# Patient Record
Sex: MALE | Race: BLACK OR AFRICAN AMERICAN | NOT HISPANIC OR LATINO
[De-identification: names, ages, dates, MRNs, and addresses within clinical notes are randomized per-mention and may not be internally consistent; named-entity substitution may affect disease eponyms.]

---

## 2021-09-22 PROBLEM — Z00.00 ENCOUNTER FOR PREVENTIVE HEALTH EXAMINATION: Status: ACTIVE | Noted: 2021-09-22

## 2021-09-30 ENCOUNTER — APPOINTMENT (OUTPATIENT)
Dept: NEPHROLOGY | Facility: CLINIC | Age: 71
End: 2021-09-30
Payer: MEDICARE

## 2021-09-30 VITALS
DIASTOLIC BLOOD PRESSURE: 75 MMHG | SYSTOLIC BLOOD PRESSURE: 152 MMHG | BODY MASS INDEX: 22.53 KG/M2 | WEIGHT: 170 LBS | HEIGHT: 73 IN | HEART RATE: 72 BPM

## 2021-09-30 PROCEDURE — 99204 OFFICE O/P NEW MOD 45 MIN: CPT

## 2021-09-30 RX ORDER — AMLODIPINE BESYLATE 10 MG/1
10 TABLET ORAL
Refills: 0 | Status: ACTIVE | COMMUNITY

## 2021-09-30 NOTE — HISTORY OF PRESENT ILLNESS
[FreeTextEntry1] : 71-year-old man who looks much younger than his stated age, is referred here by Dr. Luis Eid with hypertension and hyper kalemia by history.  I have no progress notes or labs from there -the patient was told his potassium was high but no numbers are available.  I have called the office but they are on lunch break for 90 minutes.  He has been hypertensive for several years, and is currently on amlodipine 10 mg daily, metoprolol 100 mg daily, and telmisartan 80 mg daily.  He only began telmisartan recently, after another pill was stopped, which we think was losartan HCT -but not yet corroborated.  He has no history of renal disease or cardiac disease.  He does not check BP at home, but his BP was generally 1 30-1 35 systolic on his old regimen, and is closer to 140 recently on the new regimen.  He exercises regularly and actually does sprint triathlons.  He does not add salt to his food but does eat out in restaurants regularly.  He is a non-smoker and there is no family history of hypertension or renal disease.

## 2021-09-30 NOTE — PHYSICAL EXAM
[General Appearance - Alert] : alert [General Appearance - In No Acute Distress] : in no acute distress [Sclera] : the sclera and conjunctiva were normal [PERRL With Normal Accommodation] : pupils were equal in size, round, and reactive to light [Outer Ear] : the ears and nose were normal in appearance [Neck Appearance] : the appearance of the neck was normal [Neck Cervical Mass (___cm)] : no neck mass was observed [Jugular Venous Distention Increased] : there was no jugular-venous distention [Auscultation Breath Sounds / Voice Sounds] : lungs were clear to auscultation bilaterally [Heart Rate And Rhythm] : heart rate was normal and rhythm regular [Heart Sounds] : normal S1 and S2 [Heart Sounds Gallop] : no gallops [Murmurs] : no murmurs [Heart Sounds Pericardial Friction Rub] : no pericardial rub [Abnormal Walk] : normal gait [Nail Clubbing] : no clubbing  or cyanosis of the fingernails [Musculoskeletal - Swelling] : no joint swelling seen [Motor Tone] : muscle strength and tone were normal [Skin Color & Pigmentation] : normal skin color and pigmentation [Skin Turgor] : normal skin turgor [] : no rash [Deep Tendon Reflexes (DTR)] : deep tendon reflexes were 2+ and symmetric [Sensation] : the sensory exam was normal to light touch and pinprick [No Focal Deficits] : no focal deficits [Oriented To Time, Place, And Person] : oriented to person, place, and time [Impaired Insight] : insight and judgment were intact [Affect] : the affect was normal

## 2021-09-30 NOTE — CONSULT LETTER
[Dear  ___] : Dear  [unfilled], [Consult Letter:] : I had the pleasure of evaluating your patient, [unfilled]. [Please see my note below.] : Please see my note below. [Consult Closing:] : Thank you very much for allowing me to participate in the care of this patient.  If you have any questions, please do not hesitate to contact me. [Sincerely,] : Sincerely, [FreeTextEntry2] : Dr cat Eid [FreeTextEntry3] : Sincerely, \par \par Main Mccartney MD, FACP

## 2021-09-30 NOTE — ASSESSMENT
[FreeTextEntry1] : Very youthful 71-year-old man with hypertension and a history of "hyperkalemia" -but I do not know any of his labs.  In terms of his regimen, I have asked him to stay on amlodipine 10 mg daily and telmisartan 80 mg daily -both are excellent choices.  I am not is fond of metoprolol 100 mg daily because beta-blockers are notoriously ineffective in lowering BP in -Americans.  I have asked him to taper to 50 mg daily and I am adding chlorthalidone 25 mg -one half tab Monday Wednesday Friday, which should augment the effect of the ARB and bring his BP down.  I have ordered labs to be done today including BMP, plasma potassium, uric acid, plasma renin activity, serum aldosterone, urinalysis, and urine microalbumin.  He will return in 3 weeks.  I have also asked him to buy an [employer] home BP cuff and bring it with him to the next visit.  We also discussed 24-hour ambulatory BP monitoring which I suspect we will do in the near future -that will give us a much better idea of real world blood pressures, plus establish whether he is a dipper or not.  -Americans experience a smaller dip in BP with sleep then whites, which we believe leads to a higher risk of cardiovascular, CNS, and renal damage.

## 2021-10-21 ENCOUNTER — APPOINTMENT (OUTPATIENT)
Dept: NEPHROLOGY | Facility: CLINIC | Age: 71
End: 2021-10-21
Payer: MEDICARE

## 2021-10-21 VITALS
SYSTOLIC BLOOD PRESSURE: 146 MMHG | DIASTOLIC BLOOD PRESSURE: 74 MMHG | HEART RATE: 72 BPM | WEIGHT: 170 LBS | BODY MASS INDEX: 23.03 KG/M2 | HEIGHT: 72 IN

## 2021-10-21 VITALS — SYSTOLIC BLOOD PRESSURE: 144 MMHG | DIASTOLIC BLOOD PRESSURE: 73 MMHG

## 2021-10-21 LAB
APPEARANCE: CLEAR
BACTERIA: NEGATIVE
BILIRUBIN URINE: NEGATIVE
BLOOD URINE: NORMAL
COLOR: YELLOW
GLUCOSE QUALITATIVE U: NEGATIVE
HYALINE CASTS: 1 /LPF
KETONES URINE: NEGATIVE
LEUKOCYTE ESTERASE URINE: NEGATIVE
MICROSCOPIC-UA: NORMAL
NITRITE URINE: NEGATIVE
PH URINE: 6
PROTEIN URINE: NORMAL
RED BLOOD CELLS URINE: 0 /HPF
SPECIFIC GRAVITY URINE: 1.02
SQUAMOUS EPITHELIAL CELLS: 1 /HPF
UROBILINOGEN URINE: NORMAL
WHITE BLOOD CELLS URINE: 1 /HPF

## 2021-10-21 PROCEDURE — 99214 OFFICE O/P EST MOD 30 MIN: CPT

## 2021-10-21 NOTE — ASSESSMENT
[FreeTextEntry1] : 71-year-old man with resistant hypertension, which has thus far not improved with the addition of chlorthalidone 12.5 mg Monday Wednesday Friday.  I have asked him to replace telmisartan with Edarbi 40 mg daily for the next 2 weeks.  It may be necessary to increase his chlorthalidone to daily but I will wait to see his response first.  I have ordered labs to include BMP, plasma potassium, urinalysis, uric acid.  If his BP is still not at target, I may consider replacing metoprolol with Bystolic, a beta-blocker with a very good track record in general and specifically in -Americans.  He will return in 12 to 14 days to recheck.

## 2021-10-21 NOTE — CONSULT LETTER
[Dear  ___] : Dear  [unfilled], [Consult Letter:] : I had the pleasure of evaluating your patient, [unfilled]. [Please see my note below.] : Please see my note below. [Consult Closing:] : Thank you very much for allowing me to participate in the care of this patient.  If you have any questions, please do not hesitate to contact me. [Sincerely,] : Sincerely, [FreeTextEntry2] : Dr Destiny Kent [FreeTextEntry3] : Sincerely, \par \par Main Mccartney MD, FACP\par

## 2021-10-21 NOTE — HISTORY OF PRESENT ILLNESS
[FreeTextEntry1] : 71-year-old man looking much younger than his stated age, referred by Dr. Destiny Kent because of resistant hypertension.  His BP was in the 148-152/74-76 range 3 weeks ago on a regimen of telmisartan 80 mg daily, amlodipine 10 mg daily, metoprolol 100 mg daily.  I asked him to reduce metoprolol to 50, because beta-blockers are relatively ineffective in -Americans.  I added chlorthalidone 12.5 mg Monday Wednesday Friday to further lower blood pressure, synergize with the ARB, and lower potassium.  He feels better on this regimen in a nonspecific fashion.  He does note mild constipation, which could be diuretic related.  However his BP has not yet improved.

## 2021-10-27 LAB
ANION GAP SERPL CALC-SCNC: 15 MMOL/L
BUN SERPL-MCNC: 29 MG/DL
CALCIUM SERPL-MCNC: 9.5 MG/DL
CHLORIDE SERPL-SCNC: 106 MMOL/L
CO2 SERPL-SCNC: 17 MMOL/L
CREAT SERPL-MCNC: 1.64 MG/DL
GLUCOSE SERPL-MCNC: 88 MG/DL
POTASSIUM SERPL-SCNC: 4.8 MMOL/L
POTASSIUM SERPL-SCNC: 4.9 MMOL/L
SODIUM SERPL-SCNC: 139 MMOL/L
URATE SERPL-MCNC: 9.6 MG/DL

## 2021-11-15 ENCOUNTER — APPOINTMENT (OUTPATIENT)
Dept: NEPHROLOGY | Facility: CLINIC | Age: 71
End: 2021-11-15
Payer: MEDICARE

## 2021-11-15 VITALS
WEIGHT: 170 LBS | HEIGHT: 72 IN | BODY MASS INDEX: 23.03 KG/M2 | HEART RATE: 70 BPM | DIASTOLIC BLOOD PRESSURE: 72 MMHG | SYSTOLIC BLOOD PRESSURE: 138 MMHG

## 2021-11-15 PROCEDURE — 99214 OFFICE O/P EST MOD 30 MIN: CPT

## 2021-11-15 NOTE — PHYSICAL EXAM
[General Appearance - Alert] : alert [General Appearance - In No Acute Distress] : in no acute distress [Sclera] : the sclera and conjunctiva were normal [PERRL With Normal Accommodation] : pupils were equal in size, round, and reactive to light [Outer Ear] : the ears and nose were normal in appearance [Neck Appearance] : the appearance of the neck was normal [Neck Cervical Mass (___cm)] : no neck mass was observed [Jugular Venous Distention Increased] : there was no jugular-venous distention [Auscultation Breath Sounds / Voice Sounds] : lungs were clear to auscultation bilaterally [Heart Rate And Rhythm] : heart rate was normal and rhythm regular [Heart Sounds] : normal S1 and S2 [Heart Sounds Gallop] : no gallops [Murmurs] : no murmurs [Heart Sounds Pericardial Friction Rub] : no pericardial rub [Abnormal Walk] : normal gait [Nail Clubbing] : no clubbing  or cyanosis of the fingernails [Motor Tone] : muscle strength and tone were normal [Musculoskeletal - Swelling] : no joint swelling seen [Skin Color & Pigmentation] : normal skin color and pigmentation [Skin Turgor] : normal skin turgor [] : no rash [Deep Tendon Reflexes (DTR)] : deep tendon reflexes were 2+ and symmetric [Sensation] : the sensory exam was normal to light touch and pinprick [No Focal Deficits] : no focal deficits [Oriented To Time, Place, And Person] : oriented to person, place, and time [Impaired Insight] : insight and judgment were intact [Affect] : the affect was normal

## 2021-11-15 NOTE — ASSESSMENT
[FreeTextEntry1] : 71-year-old man with resistant hypertension, but slight worsening of renal function.  I have asked him to schedule a renal ultrasound to assess kidney size, echogenicity, and rule out obstructive uropathy.  I have also ordered a BMP and uric acid to be done in 2 months, followed by a repeat visit.

## 2021-11-15 NOTE — CONSULT LETTER
[Dear  ___] : Dear  [unfilled], [Consult Letter:] : I had the pleasure of evaluating your patient, [unfilled]. [Please see my note below.] : Please see my note below. [Consult Closing:] : Thank you very much for allowing me to participate in the care of this patient.  If you have any questions, please do not hesitate to contact me. [Sincerely,] : Sincerely, [FreeTextEntry2] : Destiny Kent [FreeTextEntry3] : Sincerely, \par \par Main Mccartney MD, FACP

## 2021-11-15 NOTE — HISTORY OF PRESENT ILLNESS
[FreeTextEntry1] : 71-year-old man referred by Dr. Destiny Kent with resistant hypertension. -BP control is improving but probably still slightly suboptimal.  He is now on amlodipine 10 mg daily, Edarbi 40 mg daily, and metoprolol 50 mg daily.  He never actually added the chlorthalidone that I intended at 12.5 mg 3 times a week.  But his uric acid was also 9.6, so I will hold off on that for now.  He feels well.  His creatinine has increased from about 1.4 up to 1.64.  He has trace proteinuria.  His K is 4.8.  His CO2 was 17, so I started him on sodium bicarbonate 650 mg twice daily.

## 2022-01-11 ENCOUNTER — APPOINTMENT (OUTPATIENT)
Dept: NEPHROLOGY | Facility: CLINIC | Age: 72
End: 2022-01-11
Payer: MEDICARE

## 2022-01-11 VITALS
HEART RATE: 68 BPM | HEIGHT: 72 IN | WEIGHT: 170 LBS | BODY MASS INDEX: 23.03 KG/M2 | DIASTOLIC BLOOD PRESSURE: 71 MMHG | SYSTOLIC BLOOD PRESSURE: 142 MMHG

## 2022-01-11 PROCEDURE — 99214 OFFICE O/P EST MOD 30 MIN: CPT

## 2022-01-11 NOTE — HISTORY OF PRESENT ILLNESS
[FreeTextEntry1] : 71-year-old man referred by Dr. Kent with resistant hypertension.  He feels considerably better since we changed his regimen -he is now on amlodipine 10 mg daily, Edarbi 80 mg daily, metoprolol 50 mg daily, but he never took the chlorthalidone that I ordered.  His creatinine in October was up to 1.64 with a GFR of 42-48 and a K of 4.9, with a CO2 of only 17.  I ordered sodium bicarbonate 650 mg twice daily, but he has not been taking it.  I explained to him in detail 3 major reasons why it is of benefit.  He loves pickles and eats them regularly.

## 2022-01-11 NOTE — ASSESSMENT
[FreeTextEntry1] : 71-year-old man with resistant hypertension and CKD 3B, who is BP has improved but is still suboptimal.  I have asked him to start chlorthalidone 12.5 mg Monday Wednesday Friday and return in 6 weeks to reassess.  I also explained why metabolic acidosis is so important to treat : 1. it accelerates deterioration of renal function ,  2.  It leads to demineralization of bone,   3.  It worsens muscle function.  He agrees to take sodium bicarbonate 650 mg twice daily.  I have ordered labs to be done including H&H, BMP, PTH, urine microalbumin.

## 2022-01-11 NOTE — CONSULT LETTER
[Dear  ___] : Dear  [unfilled], [Consult Letter:] : I had the pleasure of evaluating your patient, [unfilled]. [Please see my note below.] : Please see my note below. [Consult Closing:] : Thank you very much for allowing me to participate in the care of this patient.  If you have any questions, please do not hesitate to contact me. [Sincerely,] : Sincerely, [FreeTextEntry2] : Dr Destiny Kent [FreeTextEntry3] : Sincerely, \par \par Main Mccartney MD, FACP

## 2022-01-18 ENCOUNTER — RX RENEWAL (OUTPATIENT)
Age: 72
End: 2022-01-18

## 2022-02-28 ENCOUNTER — APPOINTMENT (OUTPATIENT)
Dept: NEPHROLOGY | Facility: CLINIC | Age: 72
End: 2022-02-28
Payer: MEDICARE

## 2022-02-28 ENCOUNTER — NON-APPOINTMENT (OUTPATIENT)
Age: 72
End: 2022-02-28

## 2022-02-28 VITALS
HEART RATE: 72 BPM | DIASTOLIC BLOOD PRESSURE: 70 MMHG | HEIGHT: 72 IN | WEIGHT: 170 LBS | BODY MASS INDEX: 23.03 KG/M2 | SYSTOLIC BLOOD PRESSURE: 136 MMHG

## 2022-02-28 PROCEDURE — 99214 OFFICE O/P EST MOD 30 MIN: CPT

## 2022-02-28 NOTE — HISTORY OF PRESENT ILLNESS
[FreeTextEntry1] : 71-year-old man referred by Dr. Destiny Kent, with resistant hypertension and CKD.  I altered his regimen recently and he reports feeling much better generally -he is now on Edarbi 80 mg daily, amlodipine 10 mg daily, metoprolol 100 mg daily, and chlorthalidone 12.5 mg Monday Wednesday Friday.  He is also on sodium bicarbonates 650 mg twice daily because of metabolic acidosis and history of hyperkalemia.  He has been relatively sedentary for most of the pandemic, but just joined a gym and knows he needs to start going regularly.  His last creatinine was 1.6 in October with a GFR of 42-48 and a K of 4.9.

## 2022-02-28 NOTE — ASSESSMENT
[FreeTextEntry1] : 71-year-old man with resistant hypertension and CKD 3B -his BP has improved but may still be slightly suboptimal.  I have asked him to buy an [employer] home BP cuff to better gauge his real world blood pressure.  If there is an element of whitecoat effect, then he may be in adequate control.  Since he is already on for good antihypertensive medications and solid dose I would prefer not to add a fifth without solid evidence.  He needs lab work rechecked -I have ordered a BMP, phosphorus, PTH, and urinalysis.  He will return in 5 to 6 months, unless an earlier visit is deemed necessary.

## 2022-03-28 ENCOUNTER — RX RENEWAL (OUTPATIENT)
Age: 72
End: 2022-03-28

## 2022-06-22 ENCOUNTER — RX RENEWAL (OUTPATIENT)
Age: 72
End: 2022-06-22

## 2022-07-07 ENCOUNTER — APPOINTMENT (OUTPATIENT)
Dept: NEPHROLOGY | Facility: CLINIC | Age: 72
End: 2022-07-07

## 2022-07-07 VITALS
WEIGHT: 170 LBS | DIASTOLIC BLOOD PRESSURE: 62 MMHG | BODY MASS INDEX: 23.03 KG/M2 | SYSTOLIC BLOOD PRESSURE: 134 MMHG | HEIGHT: 72 IN | HEART RATE: 70 BPM

## 2022-07-07 PROCEDURE — 99214 OFFICE O/P EST MOD 30 MIN: CPT

## 2022-07-07 RX ORDER — TELMISARTAN 80 MG/1
80 TABLET ORAL
Refills: 0 | Status: DISCONTINUED | COMMUNITY
End: 2022-07-07

## 2022-07-07 NOTE — HISTORY OF PRESENT ILLNESS
[FreeTextEntry1] : 71-year-old man referred by Dr. Destiny Kent with resistant hypertension and CKD.  He felt better after revising his regimen, which now consists of Edarbi 80 mg daily, amlodipine 10 mg daily, chlorthalidone 12.5 mg which was intended to be daily and then reduced to 3 times per week -but unfortunately it has caused diarrhea so he is taken it very sporadically.  He has cut back on pickles which should have helped his sodium intake.  He is on sodium bicarbonates 650 mg twice daily because of metabolic acidosis and hyperkalemia.  His K was 5.8 with a CO2 of 22, creatinine 1.5, GFR 50 in March and at that time I had asked him to increase sodium bicarbonate to 4 a day but he forgot to.  He did briefly take chlorthalidone more often but then ran into diarrhea.  He was to obtain a repeat set of electrolytes in 2 weeks but forgot to.

## 2022-07-07 NOTE — ASSESSMENT
[FreeTextEntry1] : 71-year-old man with resistant hypertension and stage III CKD.  His BP is slightly suboptimal with a systolic in the mid 130s, but that may reflect the fact that he has hardly been taking chlorthalidone.  He agrees to try taking it Monday Wednesday Friday again, which should help with his BP and potassium.  He may need to increase sodium bicarbonate as previously recommended at 2 twice daily, but will wait to see his labs later today.  He is having BMP, Cystatin C, plasma potassium, and PTH.  He will return in 4 months, preceded by labs at that time.

## 2022-07-07 NOTE — CONSULT LETTER
[Dear  ___] : Dear  [unfilled], [Consult Letter:] : I had the pleasure of evaluating your patient, [unfilled]. [Please see my note below.] : Please see my note below. [Consult Closing:] : Thank you very much for allowing me to participate in the care of this patient.  If you have any questions, please do not hesitate to contact me. [Sincerely,] : Sincerely, [FreeTextEntry2] : Destiny Kent MD [FreeTextEntry3] : Sincerely, \par \par Main Mccartney MD, FACP\par

## 2022-07-12 ENCOUNTER — NON-APPOINTMENT (OUTPATIENT)
Age: 72
End: 2022-07-12

## 2022-07-12 DIAGNOSIS — E87.5 HYPERKALEMIA: ICD-10-CM

## 2022-07-12 LAB
ANION GAP SERPL CALC-SCNC: 9 MMOL/L
BUN SERPL-MCNC: 24 MG/DL
CALCIUM SERPL-MCNC: 9.7 MG/DL
CALCIUM SERPL-MCNC: 9.7 MG/DL
CHLORIDE SERPL-SCNC: 109 MMOL/L
CO2 SERPL-SCNC: 24 MMOL/L
CREAT SERPL-MCNC: 1.7 MG/DL
CYSTATIN C SERPL-MCNC: 1.28 MG/L
EGFR: 43 ML/MIN/1.73M2
GFR/BSA.PRED SERPLBLD CYS-BASED-ARV: 54 ML/MIN/1.73M2
GLUCOSE SERPL-MCNC: 87 MG/DL
PARATHYROID HORMONE INTACT: 53 PG/ML
POTASSIUM SERPL-SCNC: 5.5 MMOL/L
POTASSIUM SERPL-SCNC: 6.1 MMOL/L
SODIUM SERPL-SCNC: 143 MMOL/L

## 2022-07-12 RX ORDER — PATIROMER 8.4 G/1
8.4 POWDER, FOR SUSPENSION ORAL
Qty: 30 | Refills: 3 | Status: ACTIVE | COMMUNITY
Start: 2022-07-12 | End: 1900-01-01

## 2022-07-20 ENCOUNTER — RX RENEWAL (OUTPATIENT)
Age: 72
End: 2022-07-20

## 2022-09-19 ENCOUNTER — RX RENEWAL (OUTPATIENT)
Age: 72
End: 2022-09-19

## 2022-09-29 ENCOUNTER — RX RENEWAL (OUTPATIENT)
Age: 72
End: 2022-09-29

## 2022-09-30 ENCOUNTER — RX RENEWAL (OUTPATIENT)
Age: 72
End: 2022-09-30

## 2022-09-30 RX ORDER — SODIUM BICARBONATE 650 MG/1
650 TABLET ORAL TWICE DAILY
Qty: 180 | Refills: 1 | Status: ACTIVE | COMMUNITY
Start: 2021-10-27 | End: 1900-01-01

## 2022-11-10 ENCOUNTER — APPOINTMENT (OUTPATIENT)
Dept: NEPHROLOGY | Facility: CLINIC | Age: 72
End: 2022-11-10

## 2022-11-10 VITALS
WEIGHT: 170 LBS | DIASTOLIC BLOOD PRESSURE: 69 MMHG | HEIGHT: 72 IN | BODY MASS INDEX: 23.03 KG/M2 | SYSTOLIC BLOOD PRESSURE: 136 MMHG | HEART RATE: 64 BPM

## 2022-11-10 LAB
CYSTATIN C SERPL-MCNC: 1.17 MG/L
GFR/BSA.PRED SERPLBLD CYS-BASED-ARV: 60 ML/MIN/1.73M2
HCT VFR BLD CALC: 42.5 %
HGB BLD-MCNC: 13.5 G/DL
POTASSIUM SERPL-SCNC: 5.2 MMOL/L

## 2022-11-10 PROCEDURE — 99214 OFFICE O/P EST MOD 30 MIN: CPT

## 2022-11-10 NOTE — HISTORY OF PRESENT ILLNESS
[FreeTextEntry1] : 72-year-old man referred by Dr. Destiny Kent with resistant hypertension, CKD, and hyperkalemia.  His hypertensive regimen consists of Edarbi 80 mg daily, amlodipine 10 mg daily, chlorthalidone 12.5 mg Monday Wednesday Friday, and metoprolol 50 mg daily.  He is on sodium bicarbonates 650 mg daily for metabolic acidosis and potassium control.  His serum K was 6.1 in July but only 5.5 on plasma so there is a pseudo element.  I ordered Veltassa at that time, but his pharmacy never got it for him.

## 2022-11-10 NOTE — PHYSICAL EXAM
[General Appearance - Alert] : alert [General Appearance - In No Acute Distress] : in no acute distress [Sclera] : the sclera and conjunctiva were normal [PERRL With Normal Accommodation] : pupils were equal in size, round, and reactive to light [Outer Ear] : the ears and nose were normal in appearance [Neck Appearance] : the appearance of the neck was normal [Neck Cervical Mass (___cm)] : no neck mass was observed [Jugular Venous Distention Increased] : there was no jugular-venous distention [Auscultation Breath Sounds / Voice Sounds] : lungs were clear to auscultation bilaterally [Heart Rate And Rhythm] : heart rate was normal and rhythm regular [Heart Sounds Gallop] : no gallops [Heart Sounds] : normal S1 and S2 [Murmurs] : no murmurs [Heart Sounds Pericardial Friction Rub] : no pericardial rub [Nail Clubbing] : no clubbing  or cyanosis of the fingernails [Abnormal Walk] : normal gait [Musculoskeletal - Swelling] : no joint swelling seen [Motor Tone] : muscle strength and tone were normal [Skin Color & Pigmentation] : normal skin color and pigmentation [Skin Turgor] : normal skin turgor [] : no rash [Deep Tendon Reflexes (DTR)] : deep tendon reflexes were 2+ and symmetric [Sensation] : the sensory exam was normal to light touch and pinprick [No Focal Deficits] : no focal deficits [Oriented To Time, Place, And Person] : oriented to person, place, and time [Impaired Insight] : insight and judgment were intact [Affect] : the affect was normal

## 2022-11-10 NOTE — ASSESSMENT
[FreeTextEntry1] : 72-year-old man with a history of resistant hypertension, who is BP has been in the 130/70 range on his current regimen of 4 drugs.  Hyperkalemia has been an ongoing issue although there is a large element of pseudo based on his plasma versus serum levels.  I have ordered labs to be done again today, to include BMP, Cystatin C, plasma potassium, H&H, PTH.  He will return in 4 months.

## 2022-11-14 LAB
ANION GAP SERPL CALC-SCNC: 14 MMOL/L
BUN SERPL-MCNC: 16 MG/DL
CALCIUM SERPL-MCNC: 9.5 MG/DL
CALCIUM SERPL-MCNC: 9.5 MG/DL
CHLORIDE SERPL-SCNC: 108 MMOL/L
CO2 SERPL-SCNC: 24 MMOL/L
CREAT SERPL-MCNC: 1.45 MG/DL
EGFR: 52 ML/MIN/1.73M2
GLUCOSE SERPL-MCNC: 95 MG/DL
PARATHYROID HORMONE INTACT: 66 PG/ML
POTASSIUM SERPL-SCNC: 5.6 MMOL/L
SODIUM SERPL-SCNC: 146 MMOL/L

## 2023-03-14 ENCOUNTER — APPOINTMENT (OUTPATIENT)
Dept: NEPHROLOGY | Facility: CLINIC | Age: 73
End: 2023-03-14
Payer: MEDICARE

## 2023-03-14 VITALS
SYSTOLIC BLOOD PRESSURE: 148 MMHG | HEART RATE: 68 BPM | DIASTOLIC BLOOD PRESSURE: 76 MMHG | BODY MASS INDEX: 23.03 KG/M2 | HEIGHT: 72 IN | WEIGHT: 170 LBS

## 2023-03-14 VITALS — DIASTOLIC BLOOD PRESSURE: 71 MMHG | SYSTOLIC BLOOD PRESSURE: 142 MMHG

## 2023-03-14 PROCEDURE — 99214 OFFICE O/P EST MOD 30 MIN: CPT

## 2023-03-14 NOTE — HISTORY OF PRESENT ILLNESS
[FreeTextEntry1] : 72-year-old man referred by Dr. Destiny Kent with resistant hypertension, CKD, and hyperkalemia.  His BP has been managed with a 4 drug regimen of Edarbi 80 mg daily, amlodipine 10 mg daily, chlorthalidone 12.5 mg Monday Wednesday Friday, and metoprolol 50 mg daily.  His BP has generally been kept in the 130/70 range.  He is on sodium bicarb and it to control metabolic acidosis and potassium.  There is an element of pseudohyperkalemia, wearing his plasma K is significantly lower than serum.  His  creatinine was 1.45 with a GFR 52 in November.  This week's labs show creatinine of 1.41, BUN 18, K5.2, CO2 18, hemoglobin 14.1, no protein on urinalysis.  So he is clinically stable.  His BP was 132 systolic last week at Dr. MAST.  However, he is not taking chlorthalidone faithfully.

## 2023-03-14 NOTE — ASSESSMENT
[FreeTextEntry1] : 72-year-old man with a history of resistant hypertension and stage III CKD -his renal function is stable.  His BP has been reasonably controlled, but is modestly elevated today in the 140s systolic I strongly urged him to take chlorthalidone every Monday Wednesday Friday, 12.5 mg.  He will continue his other 3 medications faithfully.  I put in orders for labs to be done in 6 months to include BMP, Cystatin C, plasma K, PTH.  He will follow-up regularly with Dr. Kent.

## 2023-04-23 ENCOUNTER — RX RENEWAL (OUTPATIENT)
Age: 73
End: 2023-04-23

## 2023-05-02 RX ORDER — METOPROLOL TARTRATE 100 MG/1
100 TABLET, FILM COATED ORAL
Refills: 0 | Status: DISCONTINUED | COMMUNITY
End: 2023-05-02

## 2023-05-02 RX ORDER — METOPROLOL SUCCINATE 50 MG/1
50 TABLET, EXTENDED RELEASE ORAL DAILY
Qty: 90 | Refills: 1 | Status: ACTIVE | COMMUNITY
Start: 2023-05-02 | End: 1900-01-01

## 2023-10-06 ENCOUNTER — RX RENEWAL (OUTPATIENT)
Age: 73
End: 2023-10-06

## 2023-10-06 RX ORDER — AZILSARTAN KAMEDOXOMIL 80 MG/1
80 TABLET ORAL
Qty: 90 | Refills: 1 | Status: ACTIVE | COMMUNITY
Start: 2021-11-08 | End: 1900-01-01

## 2024-01-04 ENCOUNTER — RX RENEWAL (OUTPATIENT)
Age: 74
End: 2024-01-04

## 2024-01-04 RX ORDER — CHLORTHALIDONE 25 MG/1
25 TABLET ORAL
Qty: 18 | Refills: 2 | Status: ACTIVE | COMMUNITY
Start: 2022-01-11 | End: 1900-01-01

## 2024-04-18 ENCOUNTER — APPOINTMENT (OUTPATIENT)
Dept: NEPHROLOGY | Facility: CLINIC | Age: 74
End: 2024-04-18
Payer: MEDICARE

## 2024-04-18 VITALS
DIASTOLIC BLOOD PRESSURE: 72 MMHG | SYSTOLIC BLOOD PRESSURE: 166 MMHG | BODY MASS INDEX: 23.03 KG/M2 | WEIGHT: 170 LBS | HEIGHT: 72 IN

## 2024-04-18 PROCEDURE — G2211 COMPLEX E/M VISIT ADD ON: CPT

## 2024-04-18 PROCEDURE — 99215 OFFICE O/P EST HI 40 MIN: CPT

## 2024-04-18 NOTE — ASSESSMENT
[FreeTextEntry1] : 73-year-old man with a history of resistant hypertension which was well-controlled last year, but has deteriorated recently.  BP was high at Dr Kent and again here today.  His diet needs improvement and he needs to stop smoking, which he agrees to try.  I have asked him to increase chlorthalidone to 25 mg Monday Wednesday Friday in an attempt to deal with likely sodium/volume overload.  Also, I am changing metoprolol to nebivolol, which is a more effective beta-blocker in  older and -American patients.   He agrees that we will recheck BP in 3 to 4 weeks and then later repeat labs including BMP, Cystatin C, PTH, UACR, plasma potassium.  I emphasized that tight BP control is critical to kidney health and that is our #1 priority.  If his K remains in this range, we also may need to consider spironolactone or eplerenone.  Time spent 45 minutes

## 2024-04-18 NOTE — HISTORY OF PRESENT ILLNESS
[FreeTextEntry1] : 73-year-old man referred by Dr. Destiny Kent with resistant hypertension, CKD, and hyperkalemia.  BP has been managed with a 4 drug regimen including Edarbi, amlodipine, chlorthalidone, metoprolol.  BP was generally in the 130/70 range, but when I last saw him 13 months ago, it was 142-148/71-76 here in the office.  He has been on sodium bicarb to control metabolic acidosis and hyperkalemia.  However his plasma K is always significantly lower than serum so there is an element of pseudohyperkalemia.  His K now is 4.5, with a CO2 of 26.  Unfortunately, his creatinine has increased from 1.41 up to 1.76 in the last year, with a concomitant fall and GFR from 52 down to 41.  He saw Dr. MAST on March 28, and his BP was 156 systolic there.  It is very similar here today.  He admits that his lifestyle has probably been a factor, eating badly, smoking cigarettes.  However he has maintained his good fitness habits and continues to attend the gym 3 times a week faithfully.  His dose of chlorthalidone has only been 12.5 mg twice a week.   We discussed the need for him to come more often than once a year and he agrees.  He has not used NSAIDs.  I explained that metoprolol is not a particularly effective antihypertensive agent and older black patients and he is currently on 100 mg.  However he denies fatigue or lethargy.

## 2024-04-18 NOTE — CONSULT LETTER
[Dear  ___] : Dear  [unfilled], [Consult Letter:] : I had the pleasure of evaluating your patient, [unfilled]. [Please see my note below.] : Please see my note below. [Consult Closing:] : Thank you very much for allowing me to participate in the care of this patient.  If you have any questions, please do not hesitate to contact me. [Sincerely,] : Sincerely, [FreeTextEntry2] : Dr Destiny Kent [FreeTextEntry3] : Sincerely,   Main Mccartney MD, FACP

## 2024-04-18 NOTE — PHYSICAL EXAM
[General Appearance - Alert] : alert [General Appearance - In No Acute Distress] : in no acute distress [Sclera] : the sclera and conjunctiva were normal [PERRL With Normal Accommodation] : pupils were equal in size, round, and reactive to light [Outer Ear] : the ears and nose were normal in appearance [Neck Appearance] : the appearance of the neck was normal [Neck Cervical Mass (___cm)] : no neck mass was observed [Jugular Venous Distention Increased] : there was no jugular-venous distention [Auscultation Breath Sounds / Voice Sounds] : lungs were clear to auscultation bilaterally [Heart Rate And Rhythm] : heart rate was normal and rhythm regular [Heart Sounds] : normal S1 and S2 [Heart Sounds Gallop] : no gallops [Murmurs] : no murmurs [Heart Sounds Pericardial Friction Rub] : no pericardial rub [Skin Color & Pigmentation] : normal skin color and pigmentation [Skin Turgor] : normal skin turgor [] : no rash [Deep Tendon Reflexes (DTR)] : deep tendon reflexes were 2+ and symmetric [Sensation] : the sensory exam was normal to light touch and pinprick [No Focal Deficits] : no focal deficits [Oriented To Time, Place, And Person] : oriented to person, place, and time [Impaired Insight] : insight and judgment were intact [Affect] : the affect was normal

## 2024-04-20 RX ORDER — NEBIVOLOL 10 MG/1
10 TABLET ORAL DAILY
Qty: 30 | Refills: 5 | Status: ACTIVE | COMMUNITY
Start: 2024-04-20 | End: 1900-01-01

## 2024-05-09 ENCOUNTER — APPOINTMENT (OUTPATIENT)
Dept: NEPHROLOGY | Facility: CLINIC | Age: 74
End: 2024-05-09
Payer: MEDICARE

## 2024-05-09 VITALS
DIASTOLIC BLOOD PRESSURE: 71 MMHG | HEIGHT: 72 IN | SYSTOLIC BLOOD PRESSURE: 136 MMHG | BODY MASS INDEX: 23.03 KG/M2 | WEIGHT: 170 LBS

## 2024-05-09 DIAGNOSIS — E87.20 ACIDOSIS, UNSPECIFIED: ICD-10-CM

## 2024-05-09 DIAGNOSIS — I10 ESSENTIAL (PRIMARY) HYPERTENSION: ICD-10-CM

## 2024-05-09 DIAGNOSIS — N18.9 CHRONIC KIDNEY DISEASE, UNSPECIFIED: ICD-10-CM

## 2024-05-09 DIAGNOSIS — D63.1 CHRONIC KIDNEY DISEASE, UNSPECIFIED: ICD-10-CM

## 2024-05-09 DIAGNOSIS — Z86.39 PERSONAL HISTORY OF OTHER ENDOCRINE, NUTRITIONAL AND METABOLIC DISEASE: ICD-10-CM

## 2024-05-09 DIAGNOSIS — N18.32 CHRONIC KIDNEY DISEASE, STAGE 3B: ICD-10-CM

## 2024-05-09 PROCEDURE — G2211 COMPLEX E/M VISIT ADD ON: CPT

## 2024-05-09 PROCEDURE — 99214 OFFICE O/P EST MOD 30 MIN: CPT

## 2024-05-09 NOTE — HISTORY OF PRESENT ILLNESS
[FreeTextEntry1] : 73-year-old man referred by Dr. Destiny Kent, with resistant hypertension, CKD, hyperkalemia.  His BP has been managed with a 4 drug regimen of Edarbi, amlodipine, chlorthalidone, and metoprolol.  His BP was elevated here on April 18 and in March as well when he saw Dr. MAST.  He generally exercises faithfully going to the gym 3 times a week.  His weight is not excessive.  His renal function has declined in the last 2 years, with creatinine rising from 1.4 up to the range of 1.7-1.8.  I changed metoprolol to nebivolol 10 mg daily as of 3 weeks ago and that seems to have helped.  His BP today is down to 136/71.  He also stopped smoking 3 weeks ago at my urging.

## 2024-05-09 NOTE — ASSESSMENT
[FreeTextEntry1] : 73-year-old man who is BP has improved nicely in the last 3 weeks with cessation of smoking, change in beta-blocker from metoprolol to nebivolol, and taking chlorthalidone 25 mg twice a week.  He still goes to the gym regularly and is trying to watch his diet more carefully.  He will return in 5 to 6 months, preceded by BMP, Cystatin C, and plasma potassium.

## 2024-10-02 ENCOUNTER — RX RENEWAL (OUTPATIENT)
Age: 74
End: 2024-10-02

## 2024-10-09 ENCOUNTER — APPOINTMENT (OUTPATIENT)
Dept: NEPHROLOGY | Facility: CLINIC | Age: 74
End: 2024-10-09
Payer: MEDICARE

## 2024-10-09 VITALS
SYSTOLIC BLOOD PRESSURE: 122 MMHG | DIASTOLIC BLOOD PRESSURE: 70 MMHG | BODY MASS INDEX: 23.03 KG/M2 | WEIGHT: 170 LBS | HEIGHT: 72 IN

## 2024-10-09 DIAGNOSIS — Z86.39 PERSONAL HISTORY OF OTHER ENDOCRINE, NUTRITIONAL AND METABOLIC DISEASE: ICD-10-CM

## 2024-10-09 DIAGNOSIS — E87.20 ACIDOSIS, UNSPECIFIED: ICD-10-CM

## 2024-10-09 DIAGNOSIS — N18.32 CHRONIC KIDNEY DISEASE, STAGE 3B: ICD-10-CM

## 2024-10-09 DIAGNOSIS — I10 ESSENTIAL (PRIMARY) HYPERTENSION: ICD-10-CM

## 2024-10-09 PROCEDURE — 99214 OFFICE O/P EST MOD 30 MIN: CPT

## 2024-10-09 PROCEDURE — G2211 COMPLEX E/M VISIT ADD ON: CPT

## 2024-12-11 ENCOUNTER — RX RENEWAL (OUTPATIENT)
Age: 74
End: 2024-12-11

## 2025-04-01 ENCOUNTER — APPOINTMENT (OUTPATIENT)
Dept: NEPHROLOGY | Facility: CLINIC | Age: 75
End: 2025-04-01
Payer: MEDICARE

## 2025-04-01 VITALS
DIASTOLIC BLOOD PRESSURE: 75 MMHG | WEIGHT: 170 LBS | SYSTOLIC BLOOD PRESSURE: 136 MMHG | BODY MASS INDEX: 23.03 KG/M2 | HEIGHT: 72 IN

## 2025-04-01 DIAGNOSIS — D63.1 CHRONIC KIDNEY DISEASE, UNSPECIFIED: ICD-10-CM

## 2025-04-01 DIAGNOSIS — I10 ESSENTIAL (PRIMARY) HYPERTENSION: ICD-10-CM

## 2025-04-01 DIAGNOSIS — Z86.39 PERSONAL HISTORY OF OTHER ENDOCRINE, NUTRITIONAL AND METABOLIC DISEASE: ICD-10-CM

## 2025-04-01 DIAGNOSIS — N18.9 CHRONIC KIDNEY DISEASE, UNSPECIFIED: ICD-10-CM

## 2025-04-01 DIAGNOSIS — N18.32 CHRONIC KIDNEY DISEASE, STAGE 3B: ICD-10-CM

## 2025-04-01 PROCEDURE — G2211 COMPLEX E/M VISIT ADD ON: CPT

## 2025-04-01 PROCEDURE — 99214 OFFICE O/P EST MOD 30 MIN: CPT

## 2025-04-08 ENCOUNTER — RX RENEWAL (OUTPATIENT)
Age: 75
End: 2025-04-08

## 2025-05-01 ENCOUNTER — RX RENEWAL (OUTPATIENT)
Age: 75
End: 2025-05-01

## 2025-06-18 ENCOUNTER — RX RENEWAL (OUTPATIENT)
Age: 75
End: 2025-06-18

## 2025-08-06 ENCOUNTER — NON-APPOINTMENT (OUTPATIENT)
Age: 75
End: 2025-08-06

## 2025-08-06 ENCOUNTER — APPOINTMENT (OUTPATIENT)
Dept: NEPHROLOGY | Facility: CLINIC | Age: 75
End: 2025-08-06
Payer: MEDICARE

## 2025-08-06 VITALS
HEIGHT: 72 IN | WEIGHT: 170 LBS | SYSTOLIC BLOOD PRESSURE: 128 MMHG | BODY MASS INDEX: 23.03 KG/M2 | DIASTOLIC BLOOD PRESSURE: 71 MMHG

## 2025-08-06 DIAGNOSIS — E87.20 ACIDOSIS, UNSPECIFIED: ICD-10-CM

## 2025-08-06 DIAGNOSIS — I10 ESSENTIAL (PRIMARY) HYPERTENSION: ICD-10-CM

## 2025-08-06 DIAGNOSIS — Z86.39 PERSONAL HISTORY OF OTHER ENDOCRINE, NUTRITIONAL AND METABOLIC DISEASE: ICD-10-CM

## 2025-08-06 DIAGNOSIS — N18.9 CHRONIC KIDNEY DISEASE, UNSPECIFIED: ICD-10-CM

## 2025-08-06 DIAGNOSIS — D63.1 CHRONIC KIDNEY DISEASE, UNSPECIFIED: ICD-10-CM

## 2025-08-06 PROCEDURE — 99214 OFFICE O/P EST MOD 30 MIN: CPT

## 2025-08-06 PROCEDURE — G2211 COMPLEX E/M VISIT ADD ON: CPT
